# Patient Record
Sex: MALE | Race: WHITE | NOT HISPANIC OR LATINO | Employment: FULL TIME | URBAN - METROPOLITAN AREA
[De-identification: names, ages, dates, MRNs, and addresses within clinical notes are randomized per-mention and may not be internally consistent; named-entity substitution may affect disease eponyms.]

---

## 2017-10-29 ENCOUNTER — HOSPITAL ENCOUNTER (EMERGENCY)
Facility: HOSPITAL | Age: 47
Discharge: HOME | End: 2017-10-29
Attending: EMERGENCY MEDICINE | Admitting: EMERGENCY MEDICINE
Payer: COMMERCIAL

## 2017-10-29 VITALS
OXYGEN SATURATION: 100 % | RESPIRATION RATE: 20 BRPM | TEMPERATURE: 97.9 F | WEIGHT: 188 LBS | HEIGHT: 72 IN | SYSTOLIC BLOOD PRESSURE: 124 MMHG | DIASTOLIC BLOOD PRESSURE: 74 MMHG | HEART RATE: 80 BPM | BODY MASS INDEX: 25.47 KG/M2

## 2017-10-29 DIAGNOSIS — R55 SYNCOPE: Primary | ICD-10-CM

## 2017-10-29 LAB
ANION GAP BLD CALC-SCNC: 17 MMOL/L (ref 4–13)
BUN BLD-MCNC: 21 MG/DL (ref 5–25)
CA-I BLD-SCNC: 1.16 MMOL/L (ref 1.12–1.32)
CHLORIDE BLD-SCNC: 102 MMOL/L (ref 100–108)
CREAT BLD-MCNC: 1.4 MG/DL (ref 0.6–1.3)
GFR SERPL CREATININE-BSD FRML MDRD: 59 ML/MIN/1.73SQ M
GLUCOSE SERPL-MCNC: 96 MG/DL (ref 65–140)
HCT VFR BLD CALC: 39 % (ref 36.5–49.3)
HGB BLDA-MCNC: 13.3 G/DL (ref 12–17)
PCO2 BLD: 26 MMOL/L (ref 21–32)
POTASSIUM BLD-SCNC: 4.1 MMOL/L (ref 3.5–5.3)
SODIUM BLD-SCNC: 141 MMOL/L (ref 136–145)
SPECIMEN SOURCE: ABNORMAL

## 2017-10-29 PROCEDURE — 99284 EMERGENCY DEPT VISIT MOD MDM: CPT

## 2017-10-29 PROCEDURE — 85014 HEMATOCRIT: CPT

## 2017-10-29 PROCEDURE — 93005 ELECTROCARDIOGRAM TRACING: CPT | Performed by: EMERGENCY MEDICINE

## 2017-10-29 PROCEDURE — 80047 BASIC METABLC PNL IONIZED CA: CPT

## 2017-10-29 RX ADMIN — SODIUM CHLORIDE 500 ML: 0.9 INJECTION, SOLUTION INTRAVENOUS at 22:10

## 2017-10-30 LAB
ATRIAL RATE: 75 BPM
P AXIS: 62 DEGREES
PR INTERVAL: 168 MS
QRS AXIS: 25 DEGREES
QRSD INTERVAL: 112 MS
QT INTERVAL: 384 MS
QTC INTERVAL: 428 MS
T WAVE AXIS: 38 DEGREES
VENTRICULAR RATE: 75 BPM

## 2017-10-30 NOTE — ED PROVIDER NOTES
History  Chief Complaint   Patient presents with    Syncope     Arrived on stretcher via [place] [facility] post syncopal episode, states was cutting his guininie pigs nails and jaocby blood then he felt sweaty and dizzy and passed out, lowered to floor no head injury     Cutting guinea pig nails and clipped into the quick and started bleeding profusely  Pt became lightheaded, flushed and sweaty and pale  Felt like he was going to pass out  Wife helped lower him to the floor but pt went 'out '  Per wife, eyes 'glazed' and unresponsive for 30sec - 1min  Started to come to and tried getting up and 'went out again '  Woke back up and stayed on the floor  Feels fine now  Denies any preceeding chest pain, sob/stout  No recent illness, no f/c, no cough or congestion  No fh of early CAD/arrhythmia  No other co        History provided by:  Patient and spouse   used: No    Syncope   Episode history:  Single  Most recent episode: Today  Timing:  Unable to specify  Progression:  Resolved  Chronicity:  New  Context: sight of blood    Witnessed: yes    Relieved by:  Lying down  Worsened by:  Posture  Ineffective treatments:  None tried  Associated symptoms: diaphoresis and nausea    Associated symptoms: no chest pain, no fever, no focal weakness, no headaches, no palpitations, no recent fall, no recent injury, no recent surgery, no rectal bleeding, no shortness of breath and no vomiting    Risk factors: no congenital heart disease and no coronary artery disease        None       History reviewed  No pertinent past medical history  History reviewed  No pertinent surgical history  History reviewed  No pertinent family history  I have reviewed and agree with the history as documented  Social History   Substance Use Topics    Smoking status: Never Smoker    Smokeless tobacco: Never Used    Alcohol use Yes        Review of Systems   Constitutional: Positive for diaphoresis   Negative for fever    Respiratory: Negative for shortness of breath  Cardiovascular: Positive for syncope  Negative for chest pain and palpitations  Gastrointestinal: Positive for nausea  Negative for vomiting  Neurological: Negative for focal weakness and headaches  All other systems reviewed and are negative  Physical Exam  ED Triage Vitals   Temperature Pulse Respirations Blood Pressure SpO2   10/29/17 2050 10/29/17 2050 10/29/17 2050 10/29/17 2050 10/29/17 2050   97 9 °F (36 6 °C) 72 16 109/68 100 %      Temp src Heart Rate Source Patient Position - Orthostatic VS BP Location FiO2 (%)   -- 10/29/17 2051 10/29/17 2051 10/29/17 2051 --    Monitor Lying Right arm       Pain Score       10/29/17 2050       No Pain           Orthostatic Vital Signs  Vitals:    10/29/17 2130 10/29/17 2145 10/29/17 2200 10/29/17 2211   BP:    124/74   Pulse: 73 73 77 80   Patient Position - Orthostatic VS:           Physical Exam   Constitutional: He is oriented to person, place, and time  He appears well-developed and well-nourished  HENT:   Nose: Nose normal    Eyes: Conjunctivae are normal    Neck: Neck supple  Cardiovascular: Regular rhythm and normal heart sounds  No murmur heard  Pulmonary/Chest: Effort normal and breath sounds normal    Abdominal: Soft  There is no tenderness  Musculoskeletal: He exhibits no deformity  Neurological: He is alert and oriented to person, place, and time  He has normal strength  No cranial nerve deficit or sensory deficit  GCS eye subscore is 4  GCS verbal subscore is 5  GCS motor subscore is 6  Skin: Skin is warm  Psychiatric: He has a normal mood and affect  Nursing note and vitals reviewed        ED Medications  Medications   sodium chloride 0 9 % bolus 500 mL (0 mL Intravenous Stopped 10/29/17 2210)       Diagnostic Studies  Results Reviewed     Procedure Component Value Units Date/Time    POCT Chem 8+ [[id number]]  (Abnormal) Collected:  10/29/17 2053    Lab Status:  Final result Updated:  10/29/17 2057     SODIUM, I-STAT 141 mmol/l      Potassium, i-STAT 4 1 mmol/L      Chloride, istat 102 mmol/L      CO2, i-STAT 26 mmol/L      Anion Gap, Istat 17 (H) mmol/L      Calcium, Ionized i-STAT 1 16 mmol/L      BUN, I-STAT 21 mg/dl      Creatinine, i-STAT 1 4 (H) mg/dl      eGFR 59 ml/min/1 73sq m      Glucose, i-STAT 96 mg/dl      Hct, i-STAT 39 %      Hgb, i-STAT 13 3 g/dl      Specimen Type VENOUS                 No orders to display              Procedures  ECG 12 Lead Documentation  Date/Time: 10/29/2017 8:50 PM  Performed by: Halley Johnson  Authorized by: Halley Johnson     ECG reviewed by me, the ED Provider: yes    Patient location:  ED  Previous ECG:     Previous ECG:  Unavailable  Interpretation:     Interpretation: normal    Rate:     ECG rate:  75    ECG rate assessment: normal    Rhythm:     Rhythm: sinus rhythm    QRS:     QRS axis:  Normal  ST segments:     ST segments:  Normal  T waves:     T waves: normal             Phone Contacts  ED Phone Contact    ED Course  ED Course                                MDM  Number of Diagnoses or Management Options  Syncope: new and requires workup     Amount and/or Complexity of Data Reviewed  Clinical lab tests: ordered and reviewed  Tests in the medicine section of CPT®: ordered and reviewed  Obtain history from someone other than the patient: yes  Independent visualization of images, tracings, or specimens: yes      CritCare Time    Disposition  Final diagnoses:   Syncope     Time reflects when diagnosis was documented in both MDM as applicable and the Disposition within this note     Time User Action Codes Description Comment    10/29/2017  9:51 PM Chayito KNOWLES Add [R55] Syncope       ED Disposition     ED Disposition Condition Comment    Discharge  Alex Back discharge to home/self care      Condition at discharge: Good        Follow-up Information     Follow up With Specialties Details [facility] [place] [address] , DO Internal Medicine Today As needed [address] [facility],Third Floor, [address] [facility]  [phone]          There are no discharge medications for this patient  No discharge procedures on file      ED Provider  Electronically Signed by           Benjamin Downs DO  10/30/17 2477

## 2017-10-30 NOTE — DISCHARGE INSTRUCTIONS
Syncope   WHAT YOU NEED TO KNOW:   Syncope is also called fainting or passing out  Syncope is a sudden, temporary loss of consciousness, followed by a fall from a standing or sitting position  Syncope ranges from not serious to a sign of a more serious condition that needs to be treated  You can control some health conditions that cause syncope  Your healthcare providers can help you create a plan to manage syncope and prevent episodes  DISCHARGE INSTRUCTIONS:   Seek care immediately if:   · You are bleeding because you hit your head when you fainted  · You suddenly have double vision, difficulty speaking, numbness, and cannot move your arms or legs  · You have chest pain and trouble breathing  · You vomit blood or material that looks like coffee grounds  · You see blood in your bowel movement  Contact your healthcare provider if:   · You have new or worsening symptoms  · You have another syncope episode  · You have a headache, fast heartbeat, or feel too dizzy to stand up  · You have questions or concerns about your condition or care  Follow up with your healthcare provider as directed:  Write down your questions so you remember to ask them during your visits  Manage syncope:   · Keep a record of your syncope episodes  Include your symptoms and your activity before and after the episode  The record can help your healthcare provider find the cause of your syncope and help you manage episodes  · Sit or lie down when needed  This includes when you feel dizzy, your throat is getting tight, and your vision changes  Raise your legs above the level of your heart  · Take slow, deep breaths if you start to breathe faster with anxiety or fear  This can help decrease dizziness and the feeling that you might faint  · Check your blood pressure often  This is important if you take medicine to lower your blood pressure   Check your blood pressure when you are lying down and when you are standing  Ask how often to check during the day  Keep a record of your blood pressure numbers  Your healthcare provider may use the record to help plan your treatment  Prevent a syncope episode:   · Move slowly and let yourself get used to one position before you move to another position  This is very important when you change from a lying or sitting position to a standing position  Take some deep breaths before you stand up from a lying position  Stand up slowly  Sudden movements may cause a fainting spell  Sit on the side of the bed or couch for a few minutes before you stand up  If you are on bedrest, try to be upright for about 2 hours each day, or as directed  Do not lock your legs if you are standing for a long period of time  Move your legs and bend your knees to keep blood flowing  · Follow your healthcare provider's recommendations  Your provider may  recommend that you drink more liquids to prevent dehydration  You may also need to have more salt to keep your blood pressure from dropping too low and causing syncope  Your provider will tell you how much liquid and sodium to have each day  · Watch for signs of low blood sugar  These include hunger, nervousness, sweating, and fast or fluttery heartbeats  Talk with your healthcare provider about ways to keep your blood sugar level steady  · Do not strain if you are constipated  You may faint if you strain to have a bowel movement  Walking is the best way to get your bowels moving  Eat foods high in fiber to make it easier to have a bowel movement  Good examples are high-fiber cereals, beans, vegetables, and whole-grain breads  Prune juice may help make bowel movements softer  · Be careful in hot weather  Heat can cause a syncope episode  Limit activity done outside on hot days  Physical activity in hot weather can lead to dehydration  This can cause an episode    © 2017 [employer] Information is for End User's use only and may not be sold, redistributed or otherwise used for commercial purposes  All illustrations and images included in CareNotes® are the copyrighted property of [employer]  or Cliff Rosenberg  The above information is an  only  It is not intended as medical advice for individual conditions or treatments  Talk to your doctor, nurse or pharmacist before following any medical regimen to see if it is safe and effective for you